# Patient Record
(demographics unavailable — no encounter records)

---

## 2025-03-07 NOTE — REVIEW OF SYSTEMS
[As Noted in HPI] : as noted in HPI [Fever] : no fever [Chills] : no chills [Chest Pain] : no chest pain [Cough] : no cough

## 2025-03-07 NOTE — PHYSICAL EXAM
[FreeTextEntry1] : General: Constitutional: Sitting comfortably in NAD Psychiatric: well-groomed, appropriate affect, insight/judgement intact Ears, Nose, Throat: no abnormalities, mucus membranes moist Extremities: no edema, clubbing, or cyanosis Skin: no rash or neurocutaneous signs   Cognitive: Orientation, language, memory and knowledge screens intact Cranial Nerves: II: Full to confrontation; III/IV/VI: PERRL EOMI, no nystagmus [id number]: Symmetric. VII: Face appears symmetric. VIII: Normal to screening, IX/X: Palate elevates symmetrical. XI: Trapezius symmetric. XII: Tongue midline   Motor: Power: 5/5 throughout Tone: Normal x4 limbs Tremor: subtle bilateral hand tremor    Sensation: intact to light touch   Coordination/Gait: Finger-nose-finger intact, normal rapid-alternating movements Fine motor normal with normal rapid finger taps and heel tapping Romberg negative

## 2025-03-07 NOTE — HISTORY OF PRESENT ILLNESS
[FreeTextEntry1] : Miriam is a 32 year old female presenting with headaches.  Pain started gradually on Saturday, 2/15. Started around her ear on the R side radiating occipitally. Described as "hammering" in one specific spot every 5 seconds, followed by 5 seconds of relief. Would cycle in this 5s pattern until she took pain meds. Rated 6-7/10. No hypersensitivities. In the days to follow, severe pain would wake her up at night. Took Gabapentin and Aleve which would completely take the pain away within an hour. Would be able to go to sleep pain-free, but wake up with severe pain again the next day.  Pain lingered for over a week, sending to her ER on 2/24 for further evaluation. CT head normal. Since visit, pain started to calm down in frequency and intensity. Now, 1 week completely pain free.   Near-syncope about 2 weeks prior to onset of headache. Was in a parking lot coming home from a concert. Waiting for the car in a large crowd of people. Notes she is agoraphobic. Also, dehydrated and did not eat enough for dinner. Suddenly experienced generalized tingling. Felt like her legs were going to give out. Collapsed, unsure if she completely lost consciousness but able to stand up again quickly. Bystanders gave her chocolate and water which helped. Felt back to her baseline in 15 minutes.   Taking Sertraline 100mg daily for depression.   Dad had history of Parkinson's.

## 2025-03-07 NOTE — DISCUSSION/SUMMARY
[FreeTextEntry1] : Impression: 1) New headache - gradual onset new, severe "ice pick/hammering" R sided head pain. Lasted about 3 weeks until it resolved on its own. No history of migraine in the past 2) Syncope - syncopal episode in the setting of a large crowd, dehydration and possibly low blood sugar. Came to quickly, AOx3 3) Family history of Parkinson's - pt with normal muscle tone and gait. Subtle bilateral hand tremor, possibly related to Sertraline use   Plan: 1) MRI brain given new onset, severe headache